# Patient Record
Sex: MALE | Race: WHITE | NOT HISPANIC OR LATINO
[De-identification: names, ages, dates, MRNs, and addresses within clinical notes are randomized per-mention and may not be internally consistent; named-entity substitution may affect disease eponyms.]

---

## 2019-04-29 ENCOUNTER — APPOINTMENT (OUTPATIENT)
Dept: ORTHOPEDIC SURGERY | Facility: CLINIC | Age: 64
End: 2019-04-29
Payer: COMMERCIAL

## 2019-04-29 VITALS — RESPIRATION RATE: 16 BRPM | WEIGHT: 180 LBS | BODY MASS INDEX: 28.93 KG/M2 | HEIGHT: 66 IN

## 2019-04-29 DIAGNOSIS — Z86.39 PERSONAL HISTORY OF OTHER ENDOCRINE, NUTRITIONAL AND METABOLIC DISEASE: ICD-10-CM

## 2019-04-29 DIAGNOSIS — S53.22XA: ICD-10-CM

## 2019-04-29 DIAGNOSIS — Z80.9 FAMILY HISTORY OF MALIGNANT NEOPLASM, UNSPECIFIED: ICD-10-CM

## 2019-04-29 DIAGNOSIS — Z78.9 OTHER SPECIFIED HEALTH STATUS: ICD-10-CM

## 2019-04-29 PROBLEM — Z00.00 ENCOUNTER FOR PREVENTIVE HEALTH EXAMINATION: Status: ACTIVE | Noted: 2019-04-29

## 2019-04-29 PROCEDURE — 73140 X-RAY EXAM OF FINGER(S): CPT | Mod: FA

## 2019-04-29 PROCEDURE — 99203 OFFICE O/P NEW LOW 30 MIN: CPT

## 2019-04-29 PROCEDURE — 73110 X-RAY EXAM OF WRIST: CPT | Mod: 50

## 2019-04-29 RX ORDER — METOPROLOL TARTRATE 75 MG/1
TABLET, FILM COATED ORAL
Refills: 0 | Status: ACTIVE | COMMUNITY

## 2019-04-29 RX ORDER — RAMIPRIL 5 MG/1
CAPSULE ORAL
Refills: 0 | Status: ACTIVE | COMMUNITY

## 2019-04-29 RX ORDER — ROSUVASTATIN CALCIUM 5 MG/1
TABLET, FILM COATED ORAL
Refills: 0 | Status: ACTIVE | COMMUNITY

## 2019-04-30 ENCOUNTER — OUTPATIENT (OUTPATIENT)
Dept: OUTPATIENT SERVICES | Facility: HOSPITAL | Age: 64
LOS: 1 days | Discharge: HOME | End: 2019-04-30

## 2019-04-30 ENCOUNTER — APPOINTMENT (OUTPATIENT)
Dept: ORTHOPEDIC SURGERY | Facility: AMBULATORY SURGERY CENTER | Age: 64
End: 2019-04-30
Payer: COMMERCIAL

## 2019-04-30 LAB — GLUCOSE BLDC GLUCOMTR-MCNC: 117 MG/DL — HIGH (ref 70–99)

## 2019-04-30 PROCEDURE — 25609 OPTX DST RD XART FX/EP SEP3+: CPT | Mod: LT

## 2019-04-30 RX ORDER — ACETAMINOPHEN AND CODEINE 300; 30 MG/1; MG/1
300-30 TABLET ORAL
Qty: 30 | Refills: 0 | Status: DISCONTINUED | COMMUNITY
Start: 2019-04-29 | End: 2019-04-30

## 2019-05-03 RX ORDER — OXYCODONE AND ACETAMINOPHEN 5; 325 MG/1; MG/1
5-325 TABLET ORAL
Qty: 20 | Refills: 0 | Status: ACTIVE | COMMUNITY
Start: 2019-04-30 | End: 1900-01-01

## 2019-05-06 ENCOUNTER — APPOINTMENT (OUTPATIENT)
Dept: ORTHOPEDIC SURGERY | Facility: CLINIC | Age: 64
End: 2019-05-06
Payer: COMMERCIAL

## 2019-05-06 PROCEDURE — 73110 X-RAY EXAM OF WRIST: CPT | Mod: LT

## 2019-05-06 PROCEDURE — 99024 POSTOP FOLLOW-UP VISIT: CPT

## 2019-05-06 NOTE — HISTORY OF PRESENT ILLNESS
[de-identified] : The patient page me this morning complaining of poor digital range of motion, digital edema and swelling. Told him to come in today.  [de-identified] : On exam he is in a sling holding his hand not above his heart and has poor digital range of motion with digital edema. Splint removed. Forearm wrist and hand soft. With encouragement after elevation and much improved digital range of motion. Neurovascular intact distally. No hematoma. Incision well healed. No infection [de-identified] : A PA lateral oblique x-rays demonstrate excellent hardware placement and excellent reduction [de-identified] : Patient must continue with Gokul elevation block which he stopped on Thursday. Must do tendon glides and digital range of motion throughout day. We'll see back on Thursday

## 2019-05-09 ENCOUNTER — APPOINTMENT (OUTPATIENT)
Dept: ORTHOPEDIC SURGERY | Facility: CLINIC | Age: 64
End: 2019-05-09
Payer: COMMERCIAL

## 2019-05-09 PROCEDURE — 99024 POSTOP FOLLOW-UP VISIT: CPT

## 2019-05-09 PROCEDURE — 73110 X-RAY EXAM OF WRIST: CPT | Mod: LT

## 2019-05-09 PROCEDURE — 29075 APPL CST ELBW FNGR SHORT ARM: CPT | Mod: 58

## 2019-05-09 RX ORDER — OXYCODONE AND ACETAMINOPHEN 5; 325 MG/1; MG/1
5-325 TABLET ORAL
Qty: 25 | Refills: 0 | Status: ACTIVE | COMMUNITY
Start: 2019-05-09 | End: 1900-01-01

## 2019-05-14 RX ORDER — OXYCODONE AND ACETAMINOPHEN 5; 325 MG/1; MG/1
5-325 TABLET ORAL
Qty: 20 | Refills: 0 | Status: ACTIVE | COMMUNITY
Start: 2019-05-14 | End: 1900-01-01

## 2019-05-24 RX ORDER — OXYCODONE AND ACETAMINOPHEN 5; 325 MG/1; MG/1
5-325 TABLET ORAL
Qty: 10 | Refills: 0 | Status: ACTIVE | COMMUNITY
Start: 2019-05-24 | End: 1900-01-01

## 2019-05-29 ENCOUNTER — APPOINTMENT (OUTPATIENT)
Dept: ORTHOPEDIC SURGERY | Facility: CLINIC | Age: 64
End: 2019-05-29
Payer: COMMERCIAL

## 2019-05-29 PROCEDURE — 73110 X-RAY EXAM OF WRIST: CPT | Mod: LT

## 2019-05-29 PROCEDURE — 99024 POSTOP FOLLOW-UP VISIT: CPT

## 2019-05-29 RX ORDER — CLINDAMYCIN HYDROCHLORIDE 150 MG/1
150 CAPSULE ORAL EVERY 6 HOURS
Qty: 20 | Refills: 0 | Status: ACTIVE | COMMUNITY
Start: 2019-05-29 | End: 1900-01-01

## 2019-05-30 RX ORDER — OXYCODONE AND ACETAMINOPHEN 5; 325 MG/1; MG/1
5-325 TABLET ORAL
Qty: 12 | Refills: 0 | Status: ACTIVE | COMMUNITY
Start: 2019-05-30 | End: 1900-01-01

## 2019-06-18 ENCOUNTER — APPOINTMENT (OUTPATIENT)
Dept: ORTHOPEDIC SURGERY | Facility: CLINIC | Age: 64
End: 2019-06-18
Payer: COMMERCIAL

## 2019-06-18 PROCEDURE — 99024 POSTOP FOLLOW-UP VISIT: CPT

## 2019-06-18 PROCEDURE — 73110 X-RAY EXAM OF WRIST: CPT | Mod: LT

## 2019-07-30 ENCOUNTER — APPOINTMENT (OUTPATIENT)
Dept: ORTHOPEDIC SURGERY | Facility: CLINIC | Age: 64
End: 2019-07-30
Payer: COMMERCIAL

## 2019-07-30 PROCEDURE — 73110 X-RAY EXAM OF WRIST: CPT | Mod: LT

## 2019-07-30 PROCEDURE — 99212 OFFICE O/P EST SF 10 MIN: CPT

## 2019-09-24 ENCOUNTER — APPOINTMENT (OUTPATIENT)
Dept: ORTHOPEDIC SURGERY | Facility: CLINIC | Age: 64
End: 2019-09-24

## 2019-10-07 ENCOUNTER — APPOINTMENT (OUTPATIENT)
Dept: ORTHOPEDIC SURGERY | Facility: CLINIC | Age: 64
End: 2019-10-07
Payer: COMMERCIAL

## 2019-10-07 VITALS — WEIGHT: 180 LBS | RESPIRATION RATE: 16 BRPM | HEIGHT: 66 IN | BODY MASS INDEX: 28.93 KG/M2

## 2019-10-07 DIAGNOSIS — S52.572A OTHER INTRAARTICULAR FRACTURE OF LOWER END OF LEFT RADIUS, INITIAL ENCOUNTER FOR CLOSED FRACTURE: ICD-10-CM

## 2019-10-07 DIAGNOSIS — G56.02 CARPAL TUNNEL SYNDROME, LEFT UPPER LIMB: ICD-10-CM

## 2019-10-07 PROCEDURE — 20526 THER INJECTION CARP TUNNEL: CPT | Mod: LT

## 2019-10-07 PROCEDURE — 99214 OFFICE O/P EST MOD 30 MIN: CPT | Mod: 25
